# Patient Record
Sex: FEMALE | URBAN - METROPOLITAN AREA
[De-identification: names, ages, dates, MRNs, and addresses within clinical notes are randomized per-mention and may not be internally consistent; named-entity substitution may affect disease eponyms.]

---

## 2019-04-29 ENCOUNTER — OFFICE VISIT (OUTPATIENT)
Dept: URBAN - METROPOLITAN AREA CLINIC 22 | Facility: CLINIC | Age: 29
End: 2019-04-29

## 2019-04-29 DIAGNOSIS — S05.11XS CONTUSION OF EYEBALL AND ORBITAL TISSUES, RIGHT EYE, SEQUELA: ICD-10-CM

## 2019-04-29 DIAGNOSIS — H52.223 REGULAR ASTIGMATISM, BILATERAL: Primary | ICD-10-CM

## 2019-04-29 PROCEDURE — 92014 COMPRE OPH EXAM EST PT 1/>: CPT | Performed by: OPTOMETRIST

## 2019-04-29 ASSESSMENT — VISUAL ACUITY
OS: 20/40
OD: 20/30

## 2019-04-29 ASSESSMENT — INTRAOCULAR PRESSURE: OS: 21

## 2019-04-29 NOTE — IMPRESSION/PLAN
Impression: Regular astigmatism, bilateral: H52.223. Plan: Dispensed new Rx, discussed changes with patient.

## 2019-04-29 NOTE — IMPRESSION/PLAN
Impression: Contusion of eyeball and orbital tissues, right eye, sequela: S05.11XS. OD. Plan: Discussed diagnosis with patient. Will continue to monitor condition. Will dilate on May 19th. If she sees flashes or floaters that is an ER and advised to come back to clinic. Advised to alternate between cold and heat to alleviate pain.